# Patient Record
(demographics unavailable — no encounter records)

---

## 2025-03-19 NOTE — HISTORY OF PRESENT ILLNESS
[Referred] : has been referred for Home Health services [PT] : PT [OT] : OT [Education provided] : education provided [Patient is stable - had PCP appoinment] : patient is stable - had PCP appointment [Patient] : patient [Family Member] : family member [FreeTextEntry1] : osteoarthritis [FreeTextEntry2] : Ms. RAHEEM KENNEY is a 87 year female who has multiple medical conditions including: CHF, arthritis, depression, htn, hypercholesterolemia, CAD (non-obstructive), and chronic diarrhea. Seen today for f/u of chronic medical conditions.   Daughter present for visit - assisting in history.   3/19/25 Since last visit, patient with multiple UTIs, treated with a course of Ciprofloxacin and Levofloxacin. Followed up with cardiology, no new concerns. Has followed up with urology, reports nocturia 3x/night preventing her from getting a good night's sleep, daughter stating that new medication has been prescribed but is unsure what, hasn't received it from the pharmacy yet. In the meantime, patient will adjust a lifestyle, not drink fluids for two hours prior to going to bed. Otherwise, no new complaints.  9/26/24 Patient feels well since our last visit. No UTIs since. Closely following with specialists including nephrology and GI, continues to have abdominal pain and diarrhea after PO intake, this is chronic. Bilateral knee pain, using Meloxicam PRN. Has not received COVID or Flu vaccine this season. Encouraged to do so, does not want flu vaccine from our service today.   4/5/24 Patient now following with nephrology.  Completed longer course of antibiotics after which she has not had recurrent UTI.  Continuing to follow with GI for chronic diarrhea, started on Cholestyramine and using Immodium PRN with good effect.  Taking Meclizine daily for vertigo.  Has all f/u appointments scheduled.   12/6/23 Patient c/o decreased appetite, headache, and continued fatigue. Finished abx yesterday for UTI. States she feels somewhat better, but still bad. Reporting chronic diarrhea, up to 3-4x/day, with urge incontinence. States this is happening for years, no worsening from antibiotics. Is also with vaginal itching, and discomfort when "wiping". Daughter is concerned about patients PO intake, requesting regularly scheduled IVF. Wants to do everything possible to prevent future UTIs, as this was patient's 4th UTI this year.   Patient/ patient's caregiver reports no weight loss >10lbs in the past 6 months. No changes in dentition or swallowing reported, No changes in hearing or vision reported. No changes in Cognition reported.. Patient is ADL and IADL dependent. No changes in gait or falls reported - ambulating w/ RW, feels weak and unsteady.

## 2025-03-19 NOTE — END OF VISIT
[FreeTextEntry3] : All medical record entries made by the Scribe were at my, Dr. Michelle Bender, direction and personally dictated by me. I have reviewed the chart and agree that the record accurately reflects my personal performance of the history, physical exam, assessment and plan. I have also personally directed, reviewed, and agreed with the chart.

## 2025-03-19 NOTE — HEALTH RISK ASSESSMENT
[HRA Reviewed] : Health risk assessment reviewed [Independent] : using telephone [Some assistance needed] : managing finances [Full assistance needed] : using transportation [No falls in past year] : Patient reported no falls in the past year [Yes] : The patient does have visual impairment [TimeGetUpGo] : 15

## 2025-03-19 NOTE — ADDENDUM
[FreeTextEntry1] : Documented by Lennie Luis acting as a scribe under Dr. Michelle Bender. 03/19/2025

## 2025-03-19 NOTE — REASON FOR VISIT
[Follow-Up] : a follow-up visit [Family Member] : family member [Pre-Visit Preparation] : pre-visit preparation was done [Intercurrent Specialty/Sub-specialty Visits] : the patient has intercurrent specialty/sub-specialty visits [FreeTextEntry2] : chart review [FreeTextEntry3] : GI, nephrology

## 2025-03-19 NOTE — COUNSELING
[Normal Weight - ( BMI  <25 )] : normal weight - ( BMI  <25 ) [Hypertension self management education material provided] : hypertension self management education material provided [Non - Smoker] : non-smoker [Use assistive device to avoid falls] : use assistive device to avoid falls [Remove clutter and unsafe carpeting to avoid falls] : remove clutter and unsafe carpeting to avoid falls [] : foot exam [Patient not on disease-modifying anti-rheumatic drug due to overall prognosis] : Patient not on disease-modifying anti-rheumatic drug due to overall prognosis [Not Recommended] : Aspirin use not recommended due to overall prognosis [Improve mobility] : improve mobility [Decrease hospital use] : decrease hospital use [Discussed disease trajectory with patient/caregiver] : discussed disease trajectory with patient/caregiver [Patient/Caregiver not ready to engage in discussion] : patient/caregiver not ready to engage in discussion [Full Code] : Code Status: Full Code [No Limitations] : Treatment Guidelines: No limitations [Long Term Intubation] : Intubation: Long term intubation [_____] : HCP: [unfilled] [FreeTextEntry4] : Educated patient/legal representative about CCM services and consent.\par  Educated patient/legal representative that this service is available because they have 2 or more chronic conditions, that only one Provider can furnish CCM Services per calendar month and that CCM services are subject to the usual Medicare deductible and coinsurance. \par  Patient/legal representative understands the right to stop the CCM services at any time by notifying [facility] office.\par  Patient/legal representative has verbally consented 4/2023\par  \par

## 2025-03-19 NOTE — REVIEW OF SYSTEMS
[Fatigue] : fatigue [Lower Ext Edema] : lower extremity edema [Diarrhea] : diarrhea [Heartburn] : heartburn [Joint Pain] : joint pain [Joint Stiffness] : joint stiffness [Back Pain] : back pain [Depression] : depression [Negative] : Heme/Lymph [Suicidal] : not suicidal [FreeTextEntry7] : poor appetite [FreeTextEntry9] : knee pain

## 2025-03-19 NOTE — PHYSICAL EXAM
[No Acute Distress] : no acute distress [Normal Sclera/Conjunctiva] : normal sclera/conjunctiva [EOMI] : extra ocular movement intact [Normal Outer Ear/Nose] : the ears and nose were normal in appearance [No JVD] : no jugular venous distention [No Respiratory Distress] : no respiratory distress [Clear to Auscultation] : lungs were clear to auscultation bilaterally [No Accessory Muscle Use] : no accessory muscle use [Normal Rate] : heart rate was normal  [Regular Rhythm] : with a regular rhythm [Normal S1, S2] : normal S1 and S2 [Breast Exam Declined] : patient declined to have breast exam done [Normal Bowel Sounds] : normal bowel sounds [Soft] : abdomen soft [Not Distended] : not distended [Patient Refused] : rectal exam was refused by the patient [Normal Post Cervical Nodes] : no posterior cervical lymphadenopathy [Normal Anterior Cervical Nodes] : no anterior cervical lymphadenopathy [No CVA Tenderness] : no ~M costovertebral angle tenderness [No Spinal Tenderness] : no spinal tenderness [No Rash] : no rash [Cranial Nerves Intact] : cranial nerves 2-12 were intact [Oriented x3] : oriented to person, place, and time [de-identified] : sitting upright in chair [de-identified] : trace pedal edema bilaterally [de-identified] : slightly tender throughout  [de-identified] : unsteady gait, holding onto walls / furniture

## 2025-03-19 NOTE — CHRONIC CARE ASSESSMENT
[PPS Score: ____] : Palliative Performance Scale (PPS) Score: [unfilled] [FAST Score: ____] : Functional Assessment Scale (FAST) Score: [unfilled] [de-identified] : as tolerated, PT [de-identified] : low sodium